# Patient Record
Sex: MALE | Race: OTHER | HISPANIC OR LATINO
[De-identification: names, ages, dates, MRNs, and addresses within clinical notes are randomized per-mention and may not be internally consistent; named-entity substitution may affect disease eponyms.]

---

## 2024-09-19 ENCOUNTER — TRANSCRIPTION ENCOUNTER (OUTPATIENT)
Age: 24
End: 2024-09-19

## 2024-09-19 VITALS
OXYGEN SATURATION: 100 % | RESPIRATION RATE: 18 BRPM | TEMPERATURE: 98 F | HEIGHT: 65 IN | WEIGHT: 158.95 LBS | HEART RATE: 76 BPM | DIASTOLIC BLOOD PRESSURE: 83 MMHG | SYSTOLIC BLOOD PRESSURE: 130 MMHG

## 2024-09-19 LAB
ALBUMIN SERPL ELPH-MCNC: 4.3 G/DL — SIGNIFICANT CHANGE UP (ref 3.4–5)
ALP SERPL-CCNC: 64 U/L — SIGNIFICANT CHANGE UP (ref 40–120)
ALT FLD-CCNC: 12 U/L — SIGNIFICANT CHANGE UP (ref 12–42)
ANION GAP SERPL CALC-SCNC: 10 MMOL/L — SIGNIFICANT CHANGE UP (ref 9–16)
APPEARANCE UR: CLEAR — SIGNIFICANT CHANGE UP
AST SERPL-CCNC: 16 U/L — SIGNIFICANT CHANGE UP (ref 15–37)
BASOPHILS # BLD AUTO: 0.06 K/UL — SIGNIFICANT CHANGE UP (ref 0–0.2)
BASOPHILS NFR BLD AUTO: 0.8 % — SIGNIFICANT CHANGE UP (ref 0–2)
BILIRUB SERPL-MCNC: 0.3 MG/DL — SIGNIFICANT CHANGE UP (ref 0.2–1.2)
BILIRUB UR-MCNC: NEGATIVE — SIGNIFICANT CHANGE UP
BUN SERPL-MCNC: 17 MG/DL — SIGNIFICANT CHANGE UP (ref 7–23)
CALCIUM SERPL-MCNC: 9 MG/DL — SIGNIFICANT CHANGE UP (ref 8.5–10.5)
CHLORIDE SERPL-SCNC: 105 MMOL/L — SIGNIFICANT CHANGE UP (ref 96–108)
CO2 SERPL-SCNC: 25 MMOL/L — SIGNIFICANT CHANGE UP (ref 22–31)
COLOR SPEC: YELLOW — SIGNIFICANT CHANGE UP
CREAT SERPL-MCNC: 0.82 MG/DL — SIGNIFICANT CHANGE UP (ref 0.5–1.3)
DIFF PNL FLD: NEGATIVE — SIGNIFICANT CHANGE UP
EGFR: 126 ML/MIN/1.73M2 — SIGNIFICANT CHANGE UP
EOSINOPHIL # BLD AUTO: 0.07 K/UL — SIGNIFICANT CHANGE UP (ref 0–0.5)
EOSINOPHIL NFR BLD AUTO: 0.9 % — SIGNIFICANT CHANGE UP (ref 0–6)
GLUCOSE SERPL-MCNC: 94 MG/DL — SIGNIFICANT CHANGE UP (ref 70–99)
GLUCOSE UR QL: NEGATIVE MG/DL — SIGNIFICANT CHANGE UP
HCT VFR BLD CALC: 39.9 % — SIGNIFICANT CHANGE UP (ref 39–50)
HGB BLD-MCNC: 13.8 G/DL — SIGNIFICANT CHANGE UP (ref 13–17)
HIV 1 & 2 AB SERPL IA.RAPID: SIGNIFICANT CHANGE UP
IMM GRANULOCYTES NFR BLD AUTO: 0.4 % — SIGNIFICANT CHANGE UP (ref 0–0.9)
KETONES UR-MCNC: NEGATIVE MG/DL — SIGNIFICANT CHANGE UP
LEUKOCYTE ESTERASE UR-ACNC: NEGATIVE — SIGNIFICANT CHANGE UP
LIDOCAIN IGE QN: 37 U/L — SIGNIFICANT CHANGE UP (ref 16–77)
LYMPHOCYTES # BLD AUTO: 2.61 K/UL — SIGNIFICANT CHANGE UP (ref 1–3.3)
LYMPHOCYTES # BLD AUTO: 33.9 % — SIGNIFICANT CHANGE UP (ref 13–44)
MCHC RBC-ENTMCNC: 32.3 PG — SIGNIFICANT CHANGE UP (ref 27–34)
MCHC RBC-ENTMCNC: 34.6 GM/DL — SIGNIFICANT CHANGE UP (ref 32–36)
MCV RBC AUTO: 93.4 FL — SIGNIFICANT CHANGE UP (ref 80–100)
MONOCYTES # BLD AUTO: 0.47 K/UL — SIGNIFICANT CHANGE UP (ref 0–0.9)
MONOCYTES NFR BLD AUTO: 6.1 % — SIGNIFICANT CHANGE UP (ref 2–14)
NEUTROPHILS # BLD AUTO: 4.46 K/UL — SIGNIFICANT CHANGE UP (ref 1.8–7.4)
NEUTROPHILS NFR BLD AUTO: 57.9 % — SIGNIFICANT CHANGE UP (ref 43–77)
NITRITE UR-MCNC: NEGATIVE — SIGNIFICANT CHANGE UP
NRBC # BLD: 0 /100 WBCS — SIGNIFICANT CHANGE UP (ref 0–0)
PH UR: 6 — SIGNIFICANT CHANGE UP (ref 5–8)
PLATELET # BLD AUTO: 245 K/UL — SIGNIFICANT CHANGE UP (ref 150–400)
POTASSIUM SERPL-MCNC: 3.6 MMOL/L — SIGNIFICANT CHANGE UP (ref 3.5–5.3)
POTASSIUM SERPL-SCNC: 3.6 MMOL/L — SIGNIFICANT CHANGE UP (ref 3.5–5.3)
PROT SERPL-MCNC: 7.3 G/DL — SIGNIFICANT CHANGE UP (ref 6.4–8.2)
PROT UR-MCNC: NEGATIVE MG/DL — SIGNIFICANT CHANGE UP
RBC # BLD: 4.27 M/UL — SIGNIFICANT CHANGE UP (ref 4.2–5.8)
RBC # FLD: 11.9 % — SIGNIFICANT CHANGE UP (ref 10.3–14.5)
SODIUM SERPL-SCNC: 140 MMOL/L — SIGNIFICANT CHANGE UP (ref 132–145)
SP GR SPEC: 1.01 — SIGNIFICANT CHANGE UP (ref 1–1.03)
UROBILINOGEN FLD QL: 0.2 MG/DL — SIGNIFICANT CHANGE UP (ref 0.2–1)
WBC # BLD: 7.7 K/UL — SIGNIFICANT CHANGE UP (ref 3.8–10.5)
WBC # FLD AUTO: 7.7 K/UL — SIGNIFICANT CHANGE UP (ref 3.8–10.5)

## 2024-09-19 PROCEDURE — 74177 CT ABD & PELVIS W/CONTRAST: CPT | Mod: 26,MC

## 2024-09-19 PROCEDURE — 99285 EMERGENCY DEPT VISIT HI MDM: CPT

## 2024-09-19 RX ORDER — KETOROLAC TROMETHAMINE 30 MG/ML
15 INJECTION, SOLUTION INTRAMUSCULAR ONCE
Refills: 0 | Status: DISCONTINUED | OUTPATIENT
Start: 2024-09-19 | End: 2024-09-19

## 2024-09-19 RX ADMIN — KETOROLAC TROMETHAMINE 15 MILLIGRAM(S): 30 INJECTION, SOLUTION INTRAMUSCULAR at 21:16

## 2024-09-19 NOTE — ED PROVIDER NOTE - PHYSICAL EXAMINATION
Constitutional:  Well appearing, awake, alert, oriented  and in no apparent distress.  HEENT: Airway patent, Nasal mucosa clear. Mouth with normal mucosa.   Eyes: Clear bilaterally, pupils equal, round and reactive to light.  Cardiac: Normal rate, regular rhythm.  Respiratory: Breath sounds clear and equal bilaterally.  GI: Abdomen soft, more tender over inguinal ligament than McBurny's  MSK: Spine appears normal, range of motion is not limited, no muscle or joint tenderness  Neuro: Alert and oriented, no focal deficits, no motor or sensory deficits.  Skin: Skin normal color for race, warm, dry and intact. No evidence of rash.

## 2024-09-19 NOTE — ED PROVIDER NOTE - OBJECTIVE STATEMENT
24-year-old male with no past medical history presenting to the emergency for the right groin pain for the past 3 days, states it is intermittent, also in the right flank.  No associated GI symptoms, no nausea, vomiting, diarrhea, constipation, no fever, chills.  Patient believes that he might have appendicitis, was [facility].

## 2024-09-19 NOTE — ED PROVIDER NOTE - CLINICAL SUMMARY MEDICAL DECISION MAKING FREE TEXT BOX
A/P: 24-year-old male with no past medical history presenting to the emergency for the right groin pain for the past 3 days, states it is intermittent, also in the right flank.  No associated GI symptoms, no nausea, vomiting, diarrhea, constipation, no fever, chills.  Patient believes that he might have appendicitis, was [facility]  --Differential includes but not limited to inguinal strain, abdominal wall strain, appendicitis, kidney stone, UTI  -- Plan to obtain lab work, urine studies, CT abdomen pelvis  -- Toradol for pain

## 2024-09-19 NOTE — ED PROVIDER NOTE - PROGRESS NOTE DETAILS
Patient has evidence of appendicitis on CT, however normal white count, normal vitals.  Patient started on ceftriaxone and Flagyl.  Case was discussed with attending surgeon, Dr. Aparicio, who accepts patient for admission for observation for surgery.

## 2024-09-20 ENCOUNTER — TRANSCRIPTION ENCOUNTER (OUTPATIENT)
Age: 24
End: 2024-09-20

## 2024-09-20 ENCOUNTER — OUTPATIENT (OUTPATIENT)
Dept: EMERGENCY DEPT | Facility: HOSPITAL | Age: 24
LOS: 1 days | Discharge: HOME | End: 2024-09-20
Payer: COMMERCIAL

## 2024-09-20 VITALS
DIASTOLIC BLOOD PRESSURE: 72 MMHG | TEMPERATURE: 98 F | OXYGEN SATURATION: 97 % | RESPIRATION RATE: 16 BRPM | SYSTOLIC BLOOD PRESSURE: 107 MMHG | HEART RATE: 80 BPM

## 2024-09-20 LAB
HCV AB S/CO SERPL IA: 0.06 S/CO — SIGNIFICANT CHANGE UP (ref 0–0.99)
HCV AB SERPL-IMP: SIGNIFICANT CHANGE UP

## 2024-09-20 PROCEDURE — 85025 COMPLETE CBC W/AUTO DIFF WBC: CPT

## 2024-09-20 PROCEDURE — 36415 COLL VENOUS BLD VENIPUNCTURE: CPT

## 2024-09-20 PROCEDURE — 88304 TISSUE EXAM BY PATHOLOGIST: CPT | Mod: 26

## 2024-09-20 PROCEDURE — 86803 HEPATITIS C AB TEST: CPT

## 2024-09-20 PROCEDURE — 86703 HIV-1/HIV-2 1 RESULT ANTBDY: CPT

## 2024-09-20 PROCEDURE — C9399: CPT

## 2024-09-20 PROCEDURE — 80053 COMPREHEN METABOLIC PANEL: CPT

## 2024-09-20 PROCEDURE — 44970 LAPAROSCOPY APPENDECTOMY: CPT

## 2024-09-20 PROCEDURE — 81003 URINALYSIS AUTO W/O SCOPE: CPT

## 2024-09-20 PROCEDURE — 44970 LAPAROSCOPY APPENDECTOMY: CPT | Mod: GC

## 2024-09-20 PROCEDURE — 83690 ASSAY OF LIPASE: CPT

## 2024-09-20 PROCEDURE — 99221 1ST HOSP IP/OBS SF/LOW 40: CPT | Mod: 57

## 2024-09-20 PROCEDURE — 88304 TISSUE EXAM BY PATHOLOGIST: CPT

## 2024-09-20 PROCEDURE — C1889: CPT

## 2024-09-20 PROCEDURE — 74177 CT ABD & PELVIS W/CONTRAST: CPT | Mod: MC

## 2024-09-20 DEVICE — CLIP APPLIER ETHICON LIGAMAX 5MM: Type: IMPLANTABLE DEVICE | Status: FUNCTIONAL

## 2024-09-20 DEVICE — STAPLER COVIDIEN TRI-STAPLE 45MM PURPLE RELOAD: Type: IMPLANTABLE DEVICE | Status: FUNCTIONAL

## 2024-09-20 RX ORDER — ACETAMINOPHEN 325 MG/1
1000 TABLET ORAL EVERY 6 HOURS
Refills: 0 | Status: DISCONTINUED | OUTPATIENT
Start: 2024-09-20 | End: 2024-09-20

## 2024-09-20 RX ORDER — OXYCODONE HYDROCHLORIDE 5 MG/1
1 TABLET ORAL
Qty: 6 | Refills: 0
Start: 2024-09-20

## 2024-09-20 RX ORDER — OXYCODONE HYDROCHLORIDE 5 MG/1
1 TABLET ORAL
Qty: 12 | Refills: 0
Start: 2024-09-20 | End: 2024-09-22

## 2024-09-20 RX ORDER — OXYCODONE HYDROCHLORIDE 5 MG/1
2.5 TABLET ORAL EVERY 6 HOURS
Refills: 0 | Status: DISCONTINUED | OUTPATIENT
Start: 2024-09-20 | End: 2024-09-20

## 2024-09-20 RX ORDER — METRONIDAZOLE 250 MG
500 TABLET ORAL EVERY 8 HOURS
Refills: 0 | Status: DISCONTINUED | OUTPATIENT
Start: 2024-09-20 | End: 2024-09-20

## 2024-09-20 RX ORDER — ONDANSETRON 2 MG/ML
4 INJECTION, SOLUTION INTRAMUSCULAR; INTRAVENOUS EVERY 6 HOURS
Refills: 0 | Status: DISCONTINUED | OUTPATIENT
Start: 2024-09-20 | End: 2024-09-20

## 2024-09-20 RX ORDER — METRONIDAZOLE 250 MG
500 TABLET ORAL ONCE
Refills: 0 | Status: COMPLETED | OUTPATIENT
Start: 2024-09-20 | End: 2024-09-20

## 2024-09-20 RX ORDER — OXYCODONE HYDROCHLORIDE 5 MG/1
5 TABLET ORAL EVERY 6 HOURS
Refills: 0 | Status: DISCONTINUED | OUTPATIENT
Start: 2024-09-20 | End: 2024-09-20

## 2024-09-20 RX ORDER — DOCUSATE CALCIUM 240 MG/1
1 CAPSULE ORAL
Qty: 14 | Refills: 0
Start: 2024-09-20

## 2024-09-20 RX ORDER — DOCUSATE CALCIUM 240 MG/1
1 CAPSULE ORAL
Qty: 6 | Refills: 0
Start: 2024-09-20 | End: 2024-09-22

## 2024-09-20 RX ORDER — SODIUM CHLORIDE 9 MG/ML
1000 INJECTION INTRAMUSCULAR; INTRAVENOUS; SUBCUTANEOUS ONCE
Refills: 0 | Status: COMPLETED | OUTPATIENT
Start: 2024-09-20 | End: 2024-09-20

## 2024-09-20 RX ADMIN — Medication 110 MILLILITER(S): at 07:52

## 2024-09-20 RX ADMIN — ACETAMINOPHEN 400 MILLIGRAM(S): 325 TABLET ORAL at 07:52

## 2024-09-20 RX ADMIN — KETOROLAC TROMETHAMINE 15 MILLIGRAM(S): 30 INJECTION, SOLUTION INTRAMUSCULAR at 01:26

## 2024-09-20 RX ADMIN — ACETAMINOPHEN 400 MILLIGRAM(S): 325 TABLET ORAL at 13:40

## 2024-09-20 RX ADMIN — Medication 1000 MILLIGRAM(S): at 02:02

## 2024-09-20 RX ADMIN — SODIUM CHLORIDE 1000 MILLILITER(S): 9 INJECTION INTRAMUSCULAR; INTRAVENOUS; SUBCUTANEOUS at 01:45

## 2024-09-20 RX ADMIN — Medication 100 MILLIGRAM(S): at 01:45

## 2024-09-20 RX ADMIN — ACETAMINOPHEN 1000 MILLIGRAM(S): 325 TABLET ORAL at 08:48

## 2024-09-20 RX ADMIN — Medication 100 MILLIGRAM(S): at 01:46

## 2024-09-20 NOTE — H&P ADULT - ASSESSMENT
Patient is a 25 y/o M with no PMH/PSH presenting from [facility] ED with a 1 day history of RLQ/periumbilical/R groin pain. Patient was afebrile, CBC/CMP/UA WNL. CT AP showing the appendix is dilated to 9 mm and demonstrates some mild wall thickening. No significant periappendiceal inflammatory changes. No extraluminal fluid or gas. Patient was then transferred to [facility]. Exam and imaging findings consistent with appendicitis.     NPO/IVF  CTX/Flagyl  Pain/Nausea PRN  OOBA/IS/SCDs  No SQH

## 2024-09-20 NOTE — DISCHARGE NOTE PROVIDER - NSDCFUADDAPPT_GEN_ALL_CORE_FT
Is This A New Presentation, Or A Follow-Up?: Acne
Please follow up with Dr. Mccormack in 1-2 weeks; you may call the office to make an appointment at your earliest convenience.

## 2024-09-20 NOTE — DISCHARGE NOTE NURSING/CASE MANAGEMENT/SOCIAL WORK - PATIENT PORTAL LINK FT
You can access the FollowMyHealth Patient Portal offered by [facility] by registering at the following website: [email or web address]. By joining [employer] [facility]’s FollowMyHealth portal, you will also be able to view your health information using other applications (apps) compatible with our system.

## 2024-09-20 NOTE — DISCHARGE NOTE PROVIDER - CARE PROVIDER_API CALL
Luis Alfredo Mccormack  Colon/Rectal Surgery  [facility]  [place] [address]  Phone: [phone]  Fax: [phone]  Follow Up Time:

## 2024-09-20 NOTE — PATIENT PROFILE ADULT - FALL HARM RISK - HARM RISK INTERVENTIONS
Communicate Risk of Fall with Harm to all staff/Reinforce activity limits and safety measures with patient and family/Tailored Fall Risk Interventions/Visual Cue: Yellow wristband and red socks/Bed in lowest position, wheels locked, appropriate side rails in place/Call bell, personal items and telephone in reach/Instruct patient to call for assistance before getting out of bed or chair/Non-slip footwear when patient is out of bed/[place] to call system/Physically safe environment - no spills, clutter or unnecessary equipment/Purposeful Proactive Rounding/Room/bathroom lighting operational, light cord in reach Assistance with ambulation/Assistance OOB with selected safe patient handling equipment/Communicate Risk of Fall with Harm to all staff/Monitor gait and stability/Reinforce activity limits and safety measures with patient and family/Sit up slowly, dangle for a short time, stand at bedside before walking/Tailored Fall Risk Interventions/Use of alarms - bed, chair and/or voice tab/Visual Cue: Yellow wristband and red socks/Bed in lowest position, wheels locked, appropriate side rails in place/Call bell, personal items and telephone in reach/Instruct patient to call for assistance before getting out of bed or chair/Non-slip footwear when patient is out of bed/[place] to call system/Physically safe environment - no spills, clutter or unnecessary equipment/Purposeful Proactive Rounding/Room/bathroom lighting operational, light cord in reach

## 2024-09-20 NOTE — DISCHARGE NOTE PROVIDER - NSDCMRMEDTOKEN_GEN_ALL_CORE_FT
Colace 100 mg oral capsule: 1 cap(s) orally 2 times a day as needed for  constipation  oxyCODONE 5 mg oral tablet: 1 tab(s) orally every 6 hours as needed for  severe pain MDD: 4

## 2024-09-20 NOTE — PROGRESS NOTE ADULT - SUBJECTIVE AND OBJECTIVE BOX
POST-OPERATIVE NOTE    Procedure: lap appendectomy    Diagnosis/Indication: appendicitis    Surgeon: [place]    S: Pt has no complaints. Denies CP, SOB, calf tenderness. Pain controlled with medication. Denies nausea, vomiting.    O:  T(C): --  T(F): --  HR: 85 (09-20-24 @ 11:15) (69 - 88)  BP: 120/61 (09-20-24 @ 11:15) (102/50 - 120/61)  RR: 13 (09-20-24 @ 11:15) (13 - 16)  SpO2: 99% (09-20-24 @ 11:15) (88% - 99%)  Wt(kg): --                        13.8   7.70  )-----------( 245      ( 19 Sep 2024 21:04 )             39.9     09-19    140  |  105  |  17  ----------------------------<  94  3.6   |  25  |  0.82    Ca    9.0      19 Sep 2024 21:04    TPro  7.3  /  Alb  4.3  /  TBili  0.3  /  DBili  x   /  AST  16  /  ALT  12  /  AlkPhos  64  09-19      Gen: NAD, resting comfortably in bed  C/V: NSR  Pulm: Nonlabored breathing, no respiratory distress  Abd: soft, NT/ND, incisions CDI  Extrem: no calf tenderness, SCDs in place

## 2024-09-20 NOTE — H&P ADULT - NSHPPHYSICALEXAM_GEN_ALL_CORE
Gen: Lying in bed resting comfortably in NAD  CV: NSR  Pulm: no respiratory distress or increased work of breathing  Abd: Soft, TTP RLQ/R groin, ND, no rebound or guarding  Ext: WWP, no edema

## 2024-09-20 NOTE — PATIENT PROFILE ADULT - HOME ACCESSIBILITY CONCERNS
----- Message from Estefany Oconnor none

## 2024-09-20 NOTE — H&P ADULT - HISTORY OF PRESENT ILLNESS
Patient is a 23 y/o M with no PMH/PSH presenting from [facility] ED with a 1 day history of RLQ/periumbilical/R groin pain. Patient mentions having minor on and off R groin pain 3 days ago however last night (9/20) patient began to have severe RLQ/periumbilical/R groin pain. This [facility] Mehreen KAPADIA then sent him to [facility] ED.     At [facility] ED, patient was afebrile, CBC/CMP/UA WNL. CT AP showing the appendix is dilated to 9 mm and demonstrates some mild wall thickening. No significant periappendiceal inflammatory changes. No extraluminal fluid or gas. Patient was then transferred to [facility].     Patient denies previous history of these symptoms. Patient denies nausea/vomiting/diarrhea. Last BM was at 2 pm yesterday and normal. Last PO was yesterday at 3:15 pm. No previous EGD/colonoscopy. Denies personal and family history of UC/Crohns/Colon cancer.     PMH: None  PSH: None  Medications: None  Allergies: NKDA, shrimp  FMH: Mother with HTN, Father - nothing  SH: social alcohol use, no tobacco or recreational drug use

## 2024-09-20 NOTE — PATIENT PROFILE ADULT - FUNCTIONAL ASSESSMENT - BASIC MOBILITY 6.
3-calculated by average/Not able to assess (calculate score using [facility] averaging method)  3-calculated by average/Not able to assess (calculate score using [facility] averaging method)

## 2024-09-20 NOTE — PROGRESS NOTE ADULT - ASSESSMENT
Patient is a 25 y/o M with no PMH/PSH presenting from [facility] ED with a 1 day history of RLQ/periumbilical/R groin pain. Patient was afebrile, CBC/CMP/UA WNL. CT AP showing the appendix is dilated to 9 mm and demonstrates some mild wall thickening. No significant periappendiceal inflammatory changes. No extraluminal fluid or gas. Patient was then transferred to [facility]. Exam and imaging findings consistent with appendicitis now s/p lap appendectomy    Regular  pain/nausea control  OOBA/IS/SCDs  Dc home

## 2024-09-20 NOTE — DISCHARGE NOTE PROVIDER - HOSPITAL COURSE
Patient is a 23 y/o M with no PMH/PSH presenting from [facility] ED with a 1 day history of RLQ/periumbilical/R groin pain. Patient was afebrile, CBC/CMP/UA WNL. CT AP showing the appendix is dilated to 9 mm and demonstrates some mild wall thickening. No significant periappendiceal inflammatory changes. No extraluminal fluid or gas. Patient was then transferred to [facility]. Exam and imaging findings consistent with appendicitis now s/p lap appendectomy. Patient's post-operative course was uncomplicated. Diet was advanced as tolerated and pain was well controlled on medication. On day of discharge, pt deemed stable and ready to return home with plan to follow up as an outpatient.

## 2024-09-23 PROBLEM — Z78.9 OTHER SPECIFIED HEALTH STATUS: Chronic | Status: ACTIVE | Noted: 2024-09-20

## 2024-09-23 PROBLEM — Z00.00 ENCOUNTER FOR PREVENTIVE HEALTH EXAMINATION: Status: ACTIVE | Noted: 2024-09-23

## 2024-10-02 LAB — SURGICAL PATHOLOGY STUDY: SIGNIFICANT CHANGE UP

## 2024-10-03 DIAGNOSIS — K35.80 UNSPECIFIED ACUTE APPENDICITIS: ICD-10-CM

## 2024-10-30 ENCOUNTER — APPOINTMENT (OUTPATIENT)
Dept: COLORECTAL SURGERY | Facility: CLINIC | Age: 24
End: 2024-10-30
